# Patient Record
(demographics unavailable — no encounter records)

---

## 2025-03-17 NOTE — ASSESSMENT
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  25 weeks s/p removal of bilateral ruptured breast implants, capsulectomy, mastopexy & insertion of b/l silicone implants. Doing excellent, extremely happy with results. No signs of capsular contracture or HTS  - No active issues - follow up with PMD for ongoing Singulair for asthma/season allergies - c/w Implant massage - Rx abx for implant ppx; reviewed use of ppx for CC risk reduction - All questions were answered - No active PRS issues.  Pt knows to return for evaluation for any breast changes - F/u PRN

## 2025-03-17 NOTE — PHYSICAL EXAM
[de-identified] : well developed pleasant female, NAD [de-identified] : unlabored breathing [de-identified] : Bilateral breasts are soft. Implants in good position. Incisions healed. Excellent shape and symmetry. B/l nipple sensation intact.no evidence of capsular contracture, ptosis c/w weight loss.

## 2025-03-17 NOTE — HISTORY OF PRESENT ILLNESS
[FreeTextEntry1] : 50 yo F  with PMHx of HTN, DM II on ozempic and metformin (last hgA1c 5.9), GERD, asthma, hepatic adenoma s/p excision (2011, Yoni Parekh, [facility]) who presents today for evaluation for revision of breast reconstruction. Patient  reports having bilateral mastopexy and abdominoplasty in ; followed by repair of right pectoralis muscle and placement of b/l breast implants in  (450 cc silicone implants) for correction of asymmetry following emergency laparotomy for excision of hepatic adenoma that was complicated by injury to right pectoralis muscle.   She presents today c/o constant mastalgia R>L, pressure and discomfort, asymmetry, neck and back pain with deep shoulder grooving, poor posture and chest heaviness. Pt can't find a bra that fits properly due to asymmetry and superior implant position and breast ptosis. Denies any nipple discharge, palpable nodules or skin changes.  Recent mammogram/sonogram in 2002.  Family hx: DM, Breast Cancer - mother diagnosed in her late 50s, alive and well   current bra cup size: 42 DDD, would considering downsizing.  Denies any h/o DVT, PE or MRSA infections.   Occupation -   Nonsmoker  No h/o breastfeeding.   Interval hx (22): Patient underwent MRI which revealed bilateral intracapsular rupture without evidence of extracapsular rupture.  Here to discuss treatment options.  Interval hx (23): Pt is POD# 6 s/p removal of bilateral ruptured breast implants,capsulectomy, mastopexy & insertion of b/l silicone implants. Pt denies fever/chills/cp/SOB, LE pain or swelling.Very happy with results already, reports hardness and back pain is resolved. Taking all meds as prescribed   Interval hx (23): Pt here POD#15 s/p removal of bilateral ruptured breast implants, capsulectomy, mastopexy & insertion of b/l silicone implants. Pt is extremely happy. Denies any f/c or drainage.   Interval hx (3/20/23):  6 weeks  s/p removal of bilateral ruptured breast implants, capsulectomy, mastopexy & insertion of b/l silicone implants.  Pt is very ahppy with shape and size  Interval hx (23):  Pt here 12 weeks  s/p removal of bilateral ruptured breast implants, capsulectomy, mastopexy & insertion of b/l silicone implants. Doing well and happy with results. She is now 40D cup. She feels "perfect".  She reports regaining her BL NAC sensation.  Interval hx (23). Pt presents today 10 months s/p removal of bilateral ruptured breast implants, capsulectomy, mastopexy & insertion of b/l silicone implants. She is doing great and is very happy with results. Had a formal event recently for which she used a breast tape to lift her breasts and noticed a small skin tear afterwords. Pt did not seek medication attention, took Doxycycline. Denies any pain, f/c or drainage. Now concerned with wound.   Interval hx (24):  1 year s/p removal of bilateral ruptured breast implants, capsulectomy, mastopexy & insertion of b/l silicone implants.  pt very happy with results and has newfound.  she reports weight loss, now 180 lbs.  Denies breast masses or nipple discharge  interval hx (3/17/25):  25 months s/p removal of bilateral ruptured breast implants, capsulectomy, mastopexy & insertion of b/l silicone implants.  pt very happy with results.  Denies BL breast masses, nipple discharge or retraction.  Denies change in breast shape or new pain.  Still on Singulair.